# Patient Record
Sex: MALE | Race: BLACK OR AFRICAN AMERICAN | NOT HISPANIC OR LATINO | URBAN - METROPOLITAN AREA
[De-identification: names, ages, dates, MRNs, and addresses within clinical notes are randomized per-mention and may not be internally consistent; named-entity substitution may affect disease eponyms.]

---

## 2018-01-01 ENCOUNTER — EMERGENCY (EMERGENCY)
Facility: HOSPITAL | Age: 68
LOS: 1 days | End: 2018-01-01
Attending: EMERGENCY MEDICINE
Payer: SELF-PAY

## 2018-01-01 VITALS — TEMPERATURE: 96 F | OXYGEN SATURATION: 50 %

## 2018-01-01 PROCEDURE — 99291 CRITICAL CARE FIRST HOUR: CPT | Mod: 25

## 2018-01-01 PROCEDURE — 96375 TX/PRO/DX INJ NEW DRUG ADDON: CPT

## 2018-01-01 PROCEDURE — 99291 CRITICAL CARE FIRST HOUR: CPT

## 2018-01-01 PROCEDURE — 96374 THER/PROPH/DIAG INJ IV PUSH: CPT

## 2018-08-28 NOTE — ED ADULT NURSE NOTE - NSIMPLEMENTINTERV_GEN_ALL_ED
Implemented All Universal Safety Interventions:  [place] to call system. Call bell, personal items and telephone within reach. Instruct patient to call for assistance. Room bathroom lighting operational. Non-slip footwear when patient is off stretcher. Physically safe environment: no spills, clutter or unnecessary equipment. Stretcher in lowest position, wheels locked, appropriate side rails in place.

## 2018-08-28 NOTE — ED ADULT NURSE NOTE - OBJECTIVE STATEMENT
Pt. BIB EMS, CPR in progress, Pt. intubated at the scene. Cardiac arrest unwitnessed, CPR in progress 20 minutes prior to arrival. Pt. in asystole on arrival to the ED.

## 2018-08-28 NOTE — ED PROVIDER NOTE - OBJECTIVE STATEMENT
68M unknown pmh p/w cardiac arrest. Pt was found down by bystander who called EMS, unknown down time, no family with him or ID available. PEA noted on first EMS rhythm check, was given D50 and several rounds epi, on each subsequent rhythm check had asystole.

## 2018-08-28 NOTE — ED PROVIDER NOTE - PHYSICAL EXAMINATION
PE:   GEN: unresponsive cyanotic elderly man in cardiac arrest with chest compressions and ventilation via ETT on EMS arrival.   HEAD: Atraumatic, normocephalic  EYES: pupils dilated and nonreactive b/l  CARDIAC: asystole  RESP: b/l equal b/l with bagging.   ABD: soft, nondistended  NEURO: unresponsive, pupils fixed and dilated  MSK: no apparent deformities  SKIN: cyanotic, intact

## 2018-08-28 NOTE — ED PROVIDER NOTE - PROGRESS NOTE DETAILS
STEF on scene bc unidentified accepted by ME Dr. Chandler. clinical summary sheet faxed, case number [id number] STEF on scene, Officer [place] [address], bc pt unidentified. Case accepted by ME Dr. Chandler. clinical summary sheet faxed, case number [id number]

## 2018-08-28 NOTE — ED ADULT TRIAGE NOTE - OTHER COMPLAINTS
CPR IN PRORESS/INTUBATED AT THE SCENE /NO SIGNS OF LIFE UPON ED ARRIVAL CPR IN PROGRESS/INTUBATED AT THE SCENE /NO SIGNS OF LIFE UPON ED ARRIVAL

## 2018-08-28 NOTE — ED PROVIDER NOTE - MEDICAL DECISION MAKING DETAILS
unknown downtime prior to ems arrival, ~25 min with EMS in field, intubated in field confirmed with end tidal capnography and b/l breath sounds, bedside ultrasound showed b/l lung sliding with bagging no pericardial effusion and no cardiac activity. gave 1 amp epi bicarb and cacl, 2 rhythm checks showed asystole, given time down absence of cardiac activity and persistent asystole time of death declared by myself at 8:02 pm